# Patient Record
Sex: FEMALE | Race: WHITE | NOT HISPANIC OR LATINO
[De-identification: names, ages, dates, MRNs, and addresses within clinical notes are randomized per-mention and may not be internally consistent; named-entity substitution may affect disease eponyms.]

---

## 2022-06-16 ENCOUNTER — APPOINTMENT (OUTPATIENT)
Dept: NEUROLOGY | Facility: CLINIC | Age: 52
End: 2022-06-16
Payer: MEDICARE

## 2022-06-16 VITALS
DIASTOLIC BLOOD PRESSURE: 87 MMHG | BODY MASS INDEX: 38.64 KG/M2 | HEART RATE: 87 BPM | SYSTOLIC BLOOD PRESSURE: 135 MMHG | HEIGHT: 62 IN | TEMPERATURE: 98.1 F | WEIGHT: 210 LBS | OXYGEN SATURATION: 98 %

## 2022-06-16 DIAGNOSIS — R29.810 FACIAL WEAKNESS: ICD-10-CM

## 2022-06-16 DIAGNOSIS — R51.9 HEADACHE, UNSPECIFIED: ICD-10-CM

## 2022-06-16 PROCEDURE — 99204 OFFICE O/P NEW MOD 45 MIN: CPT

## 2022-06-16 RX ORDER — METOPROLOL SUCCINATE 100 MG/1
100 TABLET, EXTENDED RELEASE ORAL
Qty: 90 | Refills: 0 | Status: ACTIVE | COMMUNITY
Start: 2022-04-02

## 2022-06-16 RX ORDER — ALBUTEROL SULFATE 90 UG/1
108 (90 BASE) INHALANT RESPIRATORY (INHALATION)
Qty: 54 | Refills: 0 | Status: ACTIVE | COMMUNITY
Start: 2022-04-13

## 2022-06-16 RX ORDER — AMLODIPINE BESYLATE 10 MG/1
10 TABLET ORAL
Qty: 90 | Refills: 0 | Status: ACTIVE | COMMUNITY
Start: 2022-04-02

## 2022-06-16 RX ORDER — ATORVASTATIN CALCIUM 20 MG/1
20 TABLET, FILM COATED ORAL
Qty: 90 | Refills: 0 | Status: ACTIVE | COMMUNITY
Start: 2022-06-02

## 2022-06-16 RX ORDER — FLUCONAZOLE 100 MG/1
100 TABLET ORAL
Qty: 3 | Refills: 0 | Status: ACTIVE | COMMUNITY
Start: 2022-04-07

## 2022-06-16 RX ORDER — AZITHROMYCIN 250 MG/1
250 TABLET, FILM COATED ORAL
Qty: 6 | Refills: 0 | Status: ACTIVE | COMMUNITY
Start: 2021-12-09

## 2022-06-16 RX ORDER — ALPRAZOLAM 0.5 MG/1
0.5 TABLET ORAL
Qty: 60 | Refills: 0 | Status: ACTIVE | COMMUNITY
Start: 2022-05-20

## 2022-06-16 RX ORDER — LOSARTAN POTASSIUM 50 MG/1
50 TABLET, FILM COATED ORAL
Qty: 90 | Refills: 0 | Status: ACTIVE | COMMUNITY
Start: 2022-05-12

## 2022-06-16 RX ORDER — LANCETS 33 GAUGE
EACH MISCELLANEOUS
Qty: 100 | Refills: 0 | Status: ACTIVE | COMMUNITY
Start: 2022-05-20

## 2022-06-16 RX ORDER — DOXYCYCLINE 100 MG/1
100 CAPSULE ORAL
Qty: 20 | Refills: 0 | Status: ACTIVE | COMMUNITY
Start: 2022-04-07

## 2022-06-16 RX ORDER — GLIPIZIDE 10 MG/1
10 TABLET ORAL
Qty: 180 | Refills: 0 | Status: ACTIVE | COMMUNITY
Start: 2022-04-02

## 2022-06-16 RX ORDER — OXYBUTYNIN CHLORIDE 10 MG/1
10 TABLET, EXTENDED RELEASE ORAL
Qty: 90 | Refills: 0 | Status: ACTIVE | COMMUNITY
Start: 2022-04-19

## 2022-06-16 RX ORDER — BUDESONIDE, GLYCOPYRROLATE, AND FORMOTEROL FUMARATE 160; 9; 4.8 UG/1; UG/1; UG/1
160-9-4.8 AEROSOL, METERED RESPIRATORY (INHALATION)
Qty: 32 | Refills: 0 | Status: ACTIVE | COMMUNITY
Start: 2022-05-20

## 2022-06-16 RX ORDER — ERGOCALCIFEROL 1.25 MG/1
1.25 MG CAPSULE, LIQUID FILLED ORAL
Qty: 12 | Refills: 0 | Status: ACTIVE | COMMUNITY
Start: 2022-05-20

## 2022-06-16 RX ORDER — PREDNISONE 10 MG/1
10 TABLET ORAL
Qty: 21 | Refills: 0 | Status: ACTIVE | COMMUNITY
Start: 2022-05-23

## 2022-06-16 NOTE — PHYSICAL EXAM
[FreeTextEntry1] : Mental Status: AAO x3, no dysarthria, no aphasia, communicating appropriately\par CN: PERRL, EOMI, VFF, V1-V3 sensation intact, hearing grossly intact, no facial asymmetry, tongue midline\par Motor: 5/5 x 4 extremities\par Sensory: intact to light touch throughout\par Reflexes: 2+ throughout, toes equivocal bilaterally\par Coordination: no dysmetria on FTN\par Gait: steady\par \par  [General Appearance - Alert] : alert [Sclera] : the sclera and conjunctiva were normal [PERRL With Normal Accommodation] : pupils were equal in size, round, reactive to light, with normal accommodation [Outer Ear] : the ears and nose were normal in appearance [Neck Appearance] : the appearance of the neck was normal [] : no respiratory distress [Abnormal Walk] : normal gait [Skin Color & Pigmentation] : normal skin color and pigmentation

## 2022-06-16 NOTE — ASSESSMENT
[FreeTextEntry1] : 53 yo woman with left sided pressure like headache and intermittent facial droop likely 2/2 migraine w/aura. Given report of intermittent temporal swelling with intermittent blurry vision for the past year it is reasonable to r/o temporal arteritis\par \par Blurry vision/Intermittent temporal swelling\par Check ESR, CRP - pt advised to get these labs checked today, she was made aware of importance of this\par Will follow up results tomorrow\par \par Headache/Facial droop\par Likely due to migraine with aura\par Check MRI brain w/wo contrast and 48 hour ambulatory EEG to r/o alternative etiologies\par \par F/u after above workup. If above workup unremarkable, will consider preventative medication for migraine at next visit

## 2022-06-17 ENCOUNTER — NON-APPOINTMENT (OUTPATIENT)
Age: 52
End: 2022-06-17

## 2022-06-24 ENCOUNTER — APPOINTMENT (OUTPATIENT)
Dept: GASTROENTEROLOGY | Facility: CLINIC | Age: 52
End: 2022-06-24

## 2022-06-24 DIAGNOSIS — R10.11 RIGHT UPPER QUADRANT PAIN: ICD-10-CM

## 2022-06-24 DIAGNOSIS — G89.29 RIGHT UPPER QUADRANT PAIN: ICD-10-CM

## 2022-06-24 RX ORDER — OMEPRAZOLE 20 MG/1
20 CAPSULE, DELAYED RELEASE ORAL
Qty: 60 | Refills: 0 | Status: ACTIVE | COMMUNITY
Start: 2022-06-15

## 2022-07-26 ENCOUNTER — APPOINTMENT (OUTPATIENT)
Dept: NEUROLOGY | Facility: CLINIC | Age: 52
End: 2022-07-26

## 2022-09-06 ENCOUNTER — APPOINTMENT (OUTPATIENT)
Dept: GASTROENTEROLOGY | Facility: CLINIC | Age: 52
End: 2022-09-06

## 2022-11-28 ENCOUNTER — RX ONLY (RX ONLY)
Age: 52
End: 2022-11-28

## 2022-11-28 ENCOUNTER — OFFICE (OUTPATIENT)
Dept: URBAN - METROPOLITAN AREA CLINIC 115 | Facility: CLINIC | Age: 52
Setting detail: OPHTHALMOLOGY
End: 2022-11-28
Payer: COMMERCIAL

## 2022-11-28 DIAGNOSIS — H00.15: ICD-10-CM

## 2022-11-28 PROCEDURE — 92002 INTRM OPH EXAM NEW PATIENT: CPT | Performed by: OPHTHALMOLOGY

## 2022-11-28 ASSESSMENT — REFRACTION_AUTOREFRACTION
OD_AXIS: 039
OS_AXIS: 127
OD_CYLINDER: -0.75
OD_SPHERE: -0.50
OS_CYLINDER: -0.75
OS_SPHERE: -0.50

## 2022-11-28 ASSESSMENT — VISUAL ACUITY
OS_BCVA: 20/25-1
OD_BCVA: 20/30

## 2022-11-28 ASSESSMENT — SPHEQUIV_DERIVED
OD_SPHEQUIV: -0.875
OS_SPHEQUIV: -0.875

## 2022-11-28 ASSESSMENT — CONFRONTATIONAL VISUAL FIELD TEST (CVF)
OD_FINDINGS: FULL
OS_FINDINGS: FULL

## 2022-11-28 ASSESSMENT — TONOMETRY
OD_IOP_MMHG: 18
OS_IOP_MMHG: 18

## 2022-12-07 ENCOUNTER — APPOINTMENT (OUTPATIENT)
Dept: NEUROLOGY | Facility: CLINIC | Age: 52
End: 2022-12-07

## 2023-10-10 NOTE — HISTORY OF PRESENT ILLNESS
[FreeTextEntry1] : 51 yo woman with medical conditions as outlined below who presents for further evaluation of headache. She states for the past 10 years, she has been getting left sided pressure like headaches. She states at times, it is associated with left facial droop, left eye ptosis and numbness on the left side of her face. She states the pain gets up to a 9/10 pain and lasts a few hours. She states the facial droop can occur with or without the headache. she states when she has the facial droop, she can function fully and her awareness is intact. She denies twitching or shaking of her extremities. She denies photophobia, phonophobia or nausea with the headache. She states the headache and facial droop occur more frequently the past year. At times, she also has blurry vision with the headache. She states she has intermittent temporal swelling for the past year and she is unsure why. She has no further complaints. 6 (moderate pain)